# Patient Record
Sex: FEMALE | Race: WHITE
[De-identification: names, ages, dates, MRNs, and addresses within clinical notes are randomized per-mention and may not be internally consistent; named-entity substitution may affect disease eponyms.]

---

## 2024-04-11 ENCOUNTER — APPOINTMENT (OUTPATIENT)
Dept: ORTHOPEDIC SURGERY | Facility: CLINIC | Age: 59
End: 2024-04-11
Payer: COMMERCIAL

## 2024-04-11 VITALS — BODY MASS INDEX: 26.98 KG/M2 | HEIGHT: 64.5 IN | WEIGHT: 160 LBS

## 2024-04-11 DIAGNOSIS — S39.012A STRAIN OF MUSCLE, FASCIA AND TENDON OF LOWER BACK, INITIAL ENCOUNTER: ICD-10-CM

## 2024-04-11 DIAGNOSIS — S76.012S STRAIN OF MUSCLE, FASCIA AND TENDON OF LEFT HIP, SEQUELA: ICD-10-CM

## 2024-04-11 DIAGNOSIS — M54.17 RADICULOPATHY, LUMBOSACRAL REGION: ICD-10-CM

## 2024-04-11 PROBLEM — Z00.00 ENCOUNTER FOR PREVENTIVE HEALTH EXAMINATION: Status: ACTIVE | Noted: 2024-04-11

## 2024-04-11 PROCEDURE — 73502 X-RAY EXAM HIP UNI 2-3 VIEWS: CPT

## 2024-04-11 PROCEDURE — 99203 OFFICE O/P NEW LOW 30 MIN: CPT | Mod: 25

## 2024-04-11 NOTE — DISCUSSION/SUMMARY
[de-identified] : Patient has gluteal strain, lumbar strain, and lumbosacral radiculitis.   Lengthy discussion regarding options was had with the patient. Nonsurgical options including but not limited to cortisone, Visco supplementation, anti-inflammatory medications (both steroidal and non-steroidal), activity modification, non-impact exercise, maintaining a healthy BMI, bracing, and icing were reviewed. Surgical options including but not limited to arthroscopy, and joint replacement were discussed as was risks, benefits and alternatives.  Discussed with patient the importance of exercising and stretching to help improve ROM and strength, Patient will procced with PT at this time to improve pain in her left hip.   Following discussion of the options the plan at this time is for the patient to go forward with organized therapy. Patient was provided with a Rx for PT at this time. Patient expressed understanding the treatment plan and will begin PT as soon as they can.   f/u PRN

## 2024-04-11 NOTE — HISTORY OF PRESENT ILLNESS
[de-identified] : Date of Injury/Onset: 04/04/24 Pain: At Rest: 6/10   With Activity: 8/10 Affecting Sleep: yes Difficulty with stairs: Yes Difficulty getting in and out of car: Yes, drivers side is worse  Sit to stand stiffness: Yes  Affects walking short/long distances? Yes  Home/Work/Recreation effected?  yes Mechanism of injury: NKI, pt drove to and from [place] and came back with pain  This is not a Work-Related Injury being treated under Worker's Compensation. This is not  an athletic injury occurring associated with an interscholastic or organized sports team. Quality of symptoms: Sharp pains and dull aches with some numbness  Improves with: Ice  Worse with:   Certain movements  Have you been treated for this in the past? No Have you had surgery for this in the past? No OTC Medicines: Motrin  RX medicines: No Heat, Ice, Elevation: Ice, Heat  CSI or Gel Injections: No Physical Therapy/ HEP: No Previous Treatment/Imaging/Studies Since Last Visit: Chiropractor, Acupuncture,   Reports Available for Review Today: Out of work/sport:  Took off this Monday, Tuesday and half of yesterday. .      School/Sport/Position/Occupation: [employer]

## 2024-04-11 NOTE — PHYSICAL EXAM
[4___] : abduction 4[unfilled]/5 [5___] : adduction 5[unfilled]/5 [] : patient ambulates without assistive device [Left] : left hip with pelvis [All Views] : anteroposterior, lateral [FreeTextEntry8] : tender in the gluteal  left paraspinal musculature  [FreeTextEntry9] : no significant thierno abnormality joint space maintained.